# Patient Record
Sex: MALE | Race: WHITE
[De-identification: names, ages, dates, MRNs, and addresses within clinical notes are randomized per-mention and may not be internally consistent; named-entity substitution may affect disease eponyms.]

---

## 2017-10-26 NOTE — ULT
PROCEDURE:

1.  Ultrasound fine needle aspiration to a 1.0 cm rounded nodule inferior left lobe of the thyroid. 
This nodule shows some internal calcifications and was given a TIRADS 5 designation. 

 

2.  Ultrasound guided fine needle aspiration to a 1.5 cm heterogeneous nodule in the mid right lobe.
 This was given a TIRADS 4 on prior exam. 

 

INDICATION:

Nodules in both lobes of the thyroid previously identified on ultrasound from [facility]. That 
exam is reviewed. The left lobe nodule is a TIRADS 5 and the right lobe nodule is a TIRADS 4 designa
tion. FNA requested for both of these nodules. 

 

FINDINGS:

1.  The rounded nodule in the inferior left lobe of the thyroid was sampled with FNA x4 using a 25 g
auge needle. Ultrasound confirmed presence of the tip of the needle within the nodule with each samp
le. Each sample was given to Dr. Chávez at bedside for preparation. 

 

2.  The nodule in the mid right lobe was also sampled with FNA x4 using a 25 gauge needle in a simil
ar fashion. 

 

PROCEDURE NOTE:

Neck was prepped and draped in a sterile manner. Local anesthesia was administered with lidocaine un
sushila ultrasound guidance. Left nodule was sampled first with FNA under ultrasound guidance. This was 
followed by FNA of the right lobe nodule with the same technique. 

 

There were no problems or complications. 

 

 

POS: [facility]

## 2022-05-19 ENCOUNTER — HOSPITAL ENCOUNTER (OUTPATIENT)
Dept: HOSPITAL 92 - CT | Age: 62
Discharge: HOME | End: 2022-05-19
Attending: THORACIC SURGERY (CARDIOTHORACIC VASCULAR SURGERY)
Payer: COMMERCIAL

## 2022-05-19 DIAGNOSIS — I25.118: Primary | ICD-10-CM

## 2022-05-19 PROCEDURE — 71250 CT THORAX DX C-: CPT
